# Patient Record
Sex: FEMALE | Race: WHITE
[De-identification: names, ages, dates, MRNs, and addresses within clinical notes are randomized per-mention and may not be internally consistent; named-entity substitution may affect disease eponyms.]

---

## 2017-06-17 ENCOUNTER — HOSPITAL ENCOUNTER (EMERGENCY)
Dept: HOSPITAL 59 - ER | Age: 72
Discharge: HOME | End: 2017-06-17
Payer: COMMERCIAL

## 2017-06-17 DIAGNOSIS — Z87.891: ICD-10-CM

## 2017-06-17 DIAGNOSIS — J20.9: Primary | ICD-10-CM

## 2017-06-17 DIAGNOSIS — R06.00: ICD-10-CM

## 2017-06-17 LAB
ALBUMIN SERPL-MCNC: 4.1 GM/DL (ref 3.5–5)
ALBUMIN/GLOB SERPL: 1.3 {RATIO} (ref 1.1–1.8)
ALP SERPL-CCNC: 80 U/L (ref 38–126)
ALT SERPL-CCNC: 27 U/L (ref 9–52)
ANION GAP SERPL CALC-SCNC: 5.6 MMOL/L (ref 7–16)
AST SERPL-CCNC: 24 U/L (ref 14–36)
BASOPHILS NFR BLD: 0 % (ref 0–6)
BILIRUB SERPL-MCNC: 0.65 MG/DL (ref 0.2–1.3)
BUN SERPL-MCNC: 19 MG/DL (ref 7–17)
CO2 SERPL-SCNC: 28.4 MMOL/L (ref 22–30)
CREAT SERPL-MCNC: 0.7 MG/DL (ref 0.52–1.04)
EOSINOPHIL NFR BLD: 1.7 % (ref 0–6)
ERYTHROCYTE [DISTWIDTH] IN BLOOD BY AUTOMATED COUNT: 12.9 % (ref 11.5–14.5)
EST GLOMERULAR FILTRATION RATE: > 60 ML/MIN
GLOBULIN SER-MCNC: 3.1 GM/DL (ref 1.4–4.8)
GLUCOSE SERPL-MCNC: 100 MG/DL (ref 70–110)
GRANULOCYTES NFR BLD: 59.5 % (ref 47–80)
HCT VFR BLD CALC: 37.1 % (ref 35–47)
HGB BLD-MCNC: 12.6 GM/DL (ref 11.6–16)
LYMPHOCYTES NFR BLD AUTO: 32 % (ref 16–45)
MCH RBC QN AUTO: 33.6 PG (ref 27–33)
MCHC RBC AUTO-ENTMCNC: 34 G/DL (ref 32–36)
MCV RBC AUTO: 99.2 FL (ref 81–97)
MONOCYTES NFR BLD: 6.8 % (ref 0–9)
PLATELET # BLD: 239 K/UL (ref 130–400)
PMV BLD AUTO: 8.7 FL (ref 7.4–10.4)
PROT SERPL-MCNC: 7.2 GM/DL (ref 6.3–8.2)
RBC # BLD AUTO: 3.74 M/UL (ref 3.8–5.4)
WBC # BLD AUTO: 5.2 K/UL (ref 4.2–12.2)

## 2017-06-17 PROCEDURE — 80053 COMPREHEN METABOLIC PANEL: CPT

## 2017-06-17 PROCEDURE — 99284 EMERGENCY DEPT VISIT MOD MDM: CPT

## 2017-06-17 PROCEDURE — 94640 AIRWAY INHALATION TREATMENT: CPT

## 2017-06-17 PROCEDURE — 93005 ELECTROCARDIOGRAM TRACING: CPT

## 2017-06-17 PROCEDURE — 93010 ELECTROCARDIOGRAM REPORT: CPT

## 2017-06-17 PROCEDURE — 85025 COMPLETE CBC W/AUTO DIFF WBC: CPT

## 2017-06-17 PROCEDURE — 71020: CPT

## 2017-06-17 NOTE — EMERGENCY DEPARTMENT RECORD
History of Present Illness





- General


Chief Complaint: Shortness of breath


Stated Complaint: WILMAN


Time Seen by Provider: 17 13:18


Source: Patient


Mode of Arrival: Ambulatory


Limitations: No limitations





- History of Present Illness


Initial Comments: 





71 yo female presents to ED with a CC of difficulty in breathing following a 

coughing fit this morning.  Patient reports URI symptoms that she was seen by 

her PCP for 1 week ago, reports that her symptoms improved until today.  

Patient denies previous heart problems, but does reports a history of asthma "

in " that resolved without further problems.  Patient denies chest pain or 

pressure, and denies fevers/chills.  Patient denies lower extremity edema.  

Patient reports that her symptoms are related to an URI/sinus infection.


MD Complaint: Cough


Onset/Timin


-: Days(s)


Severity: Moderate


Consistency: Intermittent


Improves With: Nothing


Worsens With: Coughing


Known History Of: Asthma


Context: Recent URI


Associated Symptoms: Denies other symptoms


Treatments Prior to Arrival: None





- Related Data


Home Oxygen Therapy: No


 Home Medications











 Medication  Instructions  Recorded  Confirmed  Last Taken


 


Cyclobenzaprine HCl 5 mg PO DAILY 14 09:00


 


Omeprazole [Omeprazole] 40 mg PO DAILY 14 09:00


 


Dronedarone HCl [Multaq] 400 mg PO DAILY 17 Unknown


 


Fluoxetine HCl [Fluoxetine HCl] 40 mg PO DAILY 17 Unknown


 


Losartan Potassium [Losartan 100 mg PO DAILY 17 Unknown





Potassium]    


 


Methotrexate Sodium [Trexall] 2.5 mg PO WEEKLY 17 Unknown


 


Naproxen [Naproxen] 500 mg PO Q12HR 17 Unknown








 Previous Rx's











 Medication  Instructions  Recorded


 


Albuterol Sulfate [Proair 90 mcg IH Q2H PRN #1 [email or web address] 17





Respiclick]  


 


Prednisone [Prednisone 20Mg] 20 mg PO TID #12 tab 17











 Allergies











Allergy/AdvReac Type Severity Reaction Status Date / Time


 


No Known Drug Allergies Allergy   Verified 14 14:53














Review of Systems


Constitutional: Denies: Chills, Fever, Malaise, Night sweats


Eyes: Denies: Eye discharge, Eye pain


ENT: Denies: Congestion, Ear pain, Epistaxis


Respiratory: Reports: Cough, Dyspnea.  Denies: Hemoptysis


Cardiovascular: Denies: Chest pain, Dyspnea on exertion


Endocrine: Denies: Fatigue, Heat or cold intolerance


Gastrointestinal: Denies: Abdominal pain, Nausea, Vomiting


Genitourinary: Denies: Dysuria, Frequency


Musculoskeletal: Denies: Arthralgia, Back pain, Gout, Joint swelling


Skin: Denies: Bruising, Change in color


Neurological: Denies: Abnormal gait, Confusion, Headache, Seizure


Psychiatric: Denies: Anxiety


Hematological/Lymphatic: Denies: Anemia, Blood Clots





Past Medical History





- SOCIAL HISTORY


Smoking Status: Former smoker





- RESPIRATORY


Hx Respiratory Disorders: No





- CARDIOVASCULAR


Hx Irregular Heartbeat: Yes





- NEURO


Hx Neuro Disorders: No





- GI


Hx GI Disorders: No





- 


Hx Genitourinary Disorders: No





- ENDOCRINE


Hx Endocrine Disorders: No


Hx Diabetes: No


Hx Thyroid Disease: No





- MUSCULOSKELETAL


Hx Arthritis: Yes


Comment:: low back, hips and knees.





- PSYCH


Hx Psych Problems: No





- HEMATOLOGY/ONCOLOGY


Hx Hematology/Oncology Disorders: No





Family Medical History


Hx Alcohol Use: Grandparents


Hx Cancer: Grandparents


Hx Heart Disease: Father





Physical Exam





- General


General Appearance: Alert, Oriented x3, Cooperative, No acute distress


Limitations: No limitations





- Head


Head exam: Atraumatic, Normocephalic, Normal inspection


Head exam detail: negative: Abrasion, Contusion, Finney's sign, General 

tenderness, Hematoma, Laceration





- Eye


Eye exam: Normal appearance.  negative: Conjunctival injection, Periorbital 

swelling, Periorbital tenderness, Scleral icterus





- ENT


Ear exam: negative: Auricular hematoma, Auricular trauma


Nasal Exam: negative: Active bleeding, Discharge, Dried blood, Foreign body


Mouth exam: negative: Drooling, Laceration, Muffled voice, Tongue elevation





- Neck


Neck exam: Normal inspection.  negative: Meningismus, Tenderness





- Respiratory


Respiratory exam: Decreased breath sounds.  negative: Rales, Respiratory 

distress, Rhonchi, Stridor





- Cardiovascular


Cardiovascular Exam: Regular rate, Normal rhythm, Normal heart sounds





- GI/Abdominal


GI/Abdominal exam: Soft.  negative: Rebound, Rigid, Tenderness





- Rectal


Rectal exam: Deferred





- 


 exam: Deferred





- Extremities


Extremities exam: Normal inspection.  negative: Calf tenderness, Pedal edema, 

Tenderness





- Back


Back exam: Denies: CVA tenderness (R), CVA tenderness (L)





- Neurological


Neurological exam: Alert, Normal gait, Oriented X3





- Psychiatric


Psychiatric exam: Normal affect, Normal mood





- Skin


Skin exam: Normal color.  negative: Abrasion


Type of lesion: negative: abrasion





Course





- Reevaluation(s)


Reevaluation #1: 





17 13:46


EKG: NSR 57


Normal axis, normal intervals


No acute ST-T wave changes


Unchanged from 16


Reevaluation #2: 





17 14:08


Duoneb completed, patient denies significant improvement in her symptoms. 

Awaiting laboratory studies and CXR.


Reevaluation #3: 





17 14:24


Laboratory studies reviewed and are grossly unremarkable for an acute process.


Reevaluation #4: 





17 14:41


CXR: Nothing acute, ? COPD, ? pulmonary nodule posteriorly





Patient was updated on all results, and NOW reports that the Duoneb did improve 

her symptoms.  Patient was counseled regarding follow-up CT  of the chest to 

exclude pulmonary nodule in 1-3 months.  Will discharge home on Albuterol as 

well as Prednisone for her symptoms.  Patient agrees with the plan as discussed.


17 14:45








Medical Decision Making





- Lab Data


Result diagrams: 


 17 13:55





 17 13:55





Disposition


Disposition: Discharge


Clinical Impression: 


 Bronchitis





Disposition: Home, Self-Care


Condition: (2) Stable


Instructions:  Acute Bronchitis (ED)


Additional Instructions: 


Return to ED if your symptoms worsen or if you have any concerns.


Prednisone and Albuterol as directed.


Follow-up with Dr. Nguyen in 3-5 days.


Follow-up CT Chest in 1-3 months to evaluate for possible pulmonary nodule.


Prescriptions: 


Albuterol Sulfate [Proair Respiclick] 90 mcg IH Q2H PRN #1 [email or web address]


 PRN Reason: Dyspnea


Prednisone [Prednisone 20Mg] 20 mg PO TID #12 tab


Forms:  Patient Portal Access


Time of Disposition: 14:49

## 2017-06-19 NOTE — RADIOLOGY REPORT
EXAM:  CHEST, TWO VIEWS



HISTORY:  DIFFICULTY BREATHING. 



TECHNIQUE:  Frontal and lateral views of the chest were obtained.  



Comparison:  None.  



FINDINGS:  The heart size is normal.  Atheromatous change of the thoracic 
aorta.  Osteopenia.  COPD.  Vague nodular density projecting over the mid 
thoracic spine on the lateral view only.  This is nonspecific.  This could be 
followed nonemergently.  The lungs are otherwise clear.  



IMPRESSION:  

UNDERLYING COPD.  VAGUE NODULAR DENSITY SEEN ON THE LATERAL VIEW ONLY 
PROJECTING OVER THE MID THORACIC SPINE.  THE ETIOLOGY IS INDETERMINATE.  THIS 
COULD RELATE TO ADJACENT END PLATE DEGENERATIVE CHANGES.  A TRUE PULMONARY 
NODULE IS NOT EXCLUDED.  THIS FINDING COULD FURTHER BE ASSESSED NONEMERGENTLY 
WITH CT CHEST IF THE PATIENT HAS NO PRIOR SUCH OUTSIDE STUDIES FOR COMPARISON.  



JOB NUMBER:  [id number]
[facility]

## 2018-09-18 ENCOUNTER — HOSPITAL ENCOUNTER (INPATIENT)
Dept: HOSPITAL 59 - ER | Age: 73
LOS: 1 days | Discharge: HOME | DRG: 872 | End: 2018-09-19
Attending: INTERNAL MEDICINE | Admitting: INTERNAL MEDICINE
Payer: COMMERCIAL

## 2018-09-18 DIAGNOSIS — R00.0: ICD-10-CM

## 2018-09-18 DIAGNOSIS — J45.909: ICD-10-CM

## 2018-09-18 DIAGNOSIS — R53.1: ICD-10-CM

## 2018-09-18 DIAGNOSIS — Z96.652: ICD-10-CM

## 2018-09-18 DIAGNOSIS — I48.91: ICD-10-CM

## 2018-09-18 DIAGNOSIS — M19.90: ICD-10-CM

## 2018-09-18 DIAGNOSIS — I10: ICD-10-CM

## 2018-09-18 DIAGNOSIS — E78.5: ICD-10-CM

## 2018-09-18 DIAGNOSIS — A41.9: Primary | ICD-10-CM

## 2018-09-18 DIAGNOSIS — N39.0: ICD-10-CM

## 2018-09-18 DIAGNOSIS — Z87.891: ICD-10-CM

## 2018-09-18 LAB
ALBUMIN SERPL-MCNC: 3.9 G/DL (ref 4–5)
ALBUMIN/GLOB SERPL: 1.1 {RATIO} (ref 1.1–1.8)
ALP SERPL-CCNC: 85 U/L (ref 35–104)
ALT SERPL-CCNC: 15 U/L (ref ?–33)
ANION GAP SERPL CALC-SCNC: 21 MMOL/L (ref 7–16)
APPEARANCE UR: (no result)
APTT BLD: 25.2 SECONDS (ref 24.5–39.1)
AST SERPL-CCNC: 28 U/L (ref 10–35)
BACTERIA #/AREA URNS HPF: (no result) /[HPF]
BILIRUB SERPL-MCNC: 0.9 MG/DL (ref 0.2–1)
BILIRUB UR-MCNC: NEGATIVE MG/DL
BUN SERPL-MCNC: 15 MG/DL (ref 8–23)
CO2 SERPL-SCNC: 24 MMOL/L (ref 22–29)
COLOR UR: YELLOW
CREAT SERPL-MCNC: 0.8 MG/DL (ref 0.5–0.9)
ERYTHROCYTE [DISTWIDTH] IN BLOOD BY AUTOMATED COUNT: 12.7 % (ref 11.5–14.5)
EST GLOMERULAR FILTRATION RATE: > 60 ML/MIN
FLUBV AG SPEC QL IA: NEGATIVE
GLOBULIN SER-MCNC: 3.4 GM/DL (ref 1.4–4.8)
GLUCOSE SERPL-MCNC: 128 MG/DL (ref 74–109)
GLUCOSE UR STRIP-MCNC: NEGATIVE MG/DL
HCT VFR BLD CALC: 39.2 % (ref 35–47)
HGB BLD-MCNC: 13 GM/DL (ref 11.6–16)
INR PPP: 0.9
KETONES UR QL STRIP: NEGATIVE
LEAD BLD-MCNC: NEGATIVE UG/DL
LYMPHOCYTES NFR BLD: 13 % (ref 16–45)
MCH RBC QN AUTO: 32.5 PG (ref 27–33)
MCHC RBC AUTO-ENTMCNC: 33.2 G/DL (ref 32–36)
MCV RBC AUTO: 98 FL (ref 81–97)
MONOCYTES NFR BLD: 2 % (ref 0–9)
NEUTROPHILS NFR BLD AUTO: 75 % (ref 47–80)
NEUTS BAND NFR BLD: 10 % (ref 0–5)
NITRITE UR QL STRIP: POSITIVE
PLATELET # BLD: 278 K/UL (ref 130–400)
PMV BLD AUTO: 8.8 FL (ref 7.4–10.4)
PROT SERPL-MCNC: 7.3 G/DL (ref 6.6–8.7)
PROTHROMBIN TIME: 9.4 SECONDS (ref 9.5–12.1)
RBC # BLD AUTO: 4 M/UL (ref 3.8–5.4)
RBC # UR STRIP: (no result) /UL
SQUAMOUS #/AREA URNS HPF: (no result) /HPF
URINE LEUKOCYTE ESTERASE: (no result)
UROBILINOGEN UR STRIP-ACNC: 0.2 E.U./DL (ref 0.2–1)
WBC # BLD AUTO: 7.3 K/UL (ref 4.2–12.2)
WBC #/AREA URNS HPF: >50 /[HPF]

## 2018-09-18 PROCEDURE — 99285 EMERGENCY DEPT VISIT HI MDM: CPT

## 2018-09-18 PROCEDURE — 81001 URINALYSIS AUTO W/SCOPE: CPT

## 2018-09-18 PROCEDURE — 71046 X-RAY EXAM CHEST 2 VIEWS: CPT

## 2018-09-18 PROCEDURE — 99239 HOSP IP/OBS DSCHRG MGMT >30: CPT

## 2018-09-18 PROCEDURE — 83605 ASSAY OF LACTIC ACID: CPT

## 2018-09-18 PROCEDURE — 96372 THER/PROPH/DIAG INJ SC/IM: CPT

## 2018-09-18 PROCEDURE — 90686 IIV4 VACC NO PRSV 0.5 ML IM: CPT

## 2018-09-18 PROCEDURE — 99223 1ST HOSP IP/OBS HIGH 75: CPT

## 2018-09-18 PROCEDURE — 96365 THER/PROPH/DIAG IV INF INIT: CPT

## 2018-09-18 PROCEDURE — 85610 PROTHROMBIN TIME: CPT

## 2018-09-18 PROCEDURE — 85730 THROMBOPLASTIN TIME PARTIAL: CPT

## 2018-09-18 PROCEDURE — 96366 THER/PROPH/DIAG IV INF ADDON: CPT

## 2018-09-18 PROCEDURE — 80053 COMPREHEN METABOLIC PANEL: CPT

## 2018-09-18 PROCEDURE — 87400 INFLUENZA A/B EACH AG IA: CPT

## 2018-09-18 PROCEDURE — 80048 BASIC METABOLIC PNL TOTAL CA: CPT

## 2018-09-18 PROCEDURE — 85027 COMPLETE CBC AUTOMATED: CPT

## 2018-09-18 RX ADMIN — CEFTRIAXONE SCH MLS/HR: 1 INJECTION, SOLUTION INTRAVENOUS at 21:16

## 2018-09-18 RX ADMIN — ACETAMINOPHEN PRN MG: 500 TABLET ORAL at 21:15

## 2018-09-18 RX ADMIN — CYCLOBENZAPRINE HYDROCHLORIDE ONE MG: 10 TABLET, FILM COATED ORAL at 22:46

## 2018-09-18 NOTE — HISTORY & PHYSICAL
History of Present Illness





- Date of Service


Date of Service for History & Physical: 09/18/18





- History of Present Illness


Admitting Diagnosis: Sepsis, UTI


History of Present Illness: 


73 year old female presented to ED for complaints of fever and chills since 

10pm last night.  Patient reports a mild cough, denies chest pain, shortness of 

breath, nausea, vomiting, or diarrhea.  Most recently she had a left knee total 

replacement on the 6th of September with no complications post-op.  She has not 

had pain, redness, warmth, or swelling of the knee.





Patient's past medical history includes hypertension, hyperlipidemia, a-fib, 

adn bilateral knee replacements





PCP: Wendy





ED Course: 


Labs: WBC 7.3, Bands 10%, Neutrophils 75%, HCO3 24, Anion gap 21, lactic acid 

5.4, kidney and liver function normal


UA: 3+ bacteria, WBCs, + nitrites


Blood cultures drawn


Influenza negative


Temp 103 - given motrin and tylenol


Chest x-ray negative


IV Zosyn and Rocephin





9/18/18: Patient alert & oriented x 4, resting comfortably in bed.  No 

complaints of pain at this time and afebrile.  Patient to continue receiving 

Rocephin q12h and 0.9%NS @ 125ml/hr.  Repeat lactic acid was 1.6, heart rate 

has normalized.  Patient to resume normal diet, will continue IV fluids and 

antibiotics overnight, recheck labs in the morning.








Travel Screening





- Travel/Exposure Within Last 30 Days


Have you traveled within the last 30 days?: No





- Travel/Exposure Within Last Year


Have you traveled outside the [place] in the last year?: No





- Additonal Travel Details


Have you been exposed to anyone with a communicable illness?: No





- Travel Symptoms


Symptom Screening: None





Review of Systems


Constitutional: Reports: Chills, Fever.  Denies: Malaise, Weakness


Eyes: Denies: Eye discharge


ENT: Denies: Congestion, Throat pain


Respiratory: Reports: Cough.  Denies: Dyspnea, Hemoptysis, Stridor, Wheezes


Cardiovascular: Denies: Chest pain, Edema, Palpitations, Syncope


Endocrine: Denies: Fatigue


Gastrointestinal: Denies: Abdominal pain, Diarrhea, Nausea, Vomiting


Genitourinary: Reports: Frequency.  Denies: Discharge, Dysuria, Hematuria, 

Retention, Urgency


Musculoskeletal: Denies: Arthralgia, Back pain, Joint swelling, Myalgia


Skin: Denies: Bruising, Change in color, Rash


Neurological: Denies: Headache


Psychiatric: Denies: Anxiety


Hematological/Lymphatic: Denies: Anemia, Easy bleeding, Easy bruising, Swollen 

glands





Past Medical History





- SOCIAL HISTORY


Smoking Status: Former smoker


Alcohol Use: Occasional


Alcohol Use Comment: 7/7


Drug Use: None





- RESPIRATORY


Hx Respiratory Disorders: No





- CARDIOVASCULAR


Hx Cardio Disorders: Yes


Hx Hypertension: Yes


Hx Irregular Heartbeat: Yes





- NEURO


Hx Neuro Disorders: No





- GI


Hx GI Disorders: No





- 


Hx Genitourinary Disorders: No


Hx UTI: Yes (current admit dx)


Comment:: worn peripad last several years due to leaking





- ENDOCRINE


Hx Endocrine Disorders: No


Hx Diabetes: No


Hx Thyroid Disease: No





- MUSCULOSKELETAL


Hx Musculoskeletal Disorders: Yes


Hx Arthritis: Yes


Comment:: low back, hips and knees. bilateral knee replacements this year





- PSYCH


Hx Psych Problems: No





- HEMATOLOGY/ONCOLOGY


Hx Hematology/Oncology Disorders: No





Family Medical History


Any Significant Family History?: Yes


Hx Alcohol Use: Grandparents


Hx Cancer: Grandparents


Hx Heart Disease: Father





H&P Meds/Allergies





- Allergies


Allergies: 


 Allergies











Allergy/AdvReac Type Severity Reaction Status Date / Time


 


No Known Drug Allergies Allergy   Verified 05/23/14 14:53














- Home Medications


 Home Medications











 Medication  Instructions  Recorded  Confirmed  Last Taken


 


Hydrochlorothiazide [Hctz] 25 mg PO DAILY 09/18/18 09/18/18 Unknown


 


Meloxicam 15 mg PO DAILY 09/18/18 09/18/18 Unknown


 


Propafenone HCl [Propafenone HCl 150 mg PO DAILY 09/18/18 09/18/18 Unknown





ER]    














- Active Medications


Active Medications: 


 Current Medications





Acetaminophen (Tylenol 500mg Tab)  1,000 mg PO Q6H PRN


   PRN Reason: PAIN - MILD(1-4)/FEVER


Enoxaparin Sodium (Lovenox)  40 mg SQ DAILY CK


Fluoxetine HCl (Prozac)  40 mg PO DAILY CK


Sodium Chloride ()  1,000 mls @ 125 mls/hr IV .Q8H PRN


   PRN Reason: LARGE VOLUME IV


   Last Admin: 09/18/18 17:16 Dose:  125 mls/hr


CEFTRIAXONE 1GM/50ML BAG (Ceftriaxone 1 Gm-D5w Bag)  1 gm in 50 mls @ 100 mls/

hr IVPB Q12HR CK


Ibuprofen (Motrin 400mg)  400 mg PO Q8H PRN


   PRN Reason: PAIN - MILD (1-4)


Non-Formulary Medication (Propafenone Hcl [Propafenone Hcl Er])  150 mg PO TID 

[facility]


   Last Admin: 09/18/18 18:34 Dose:  150 mg


Ondansetron HCl (Zofran)  4 mg IVP Q4H PRN


   PRN Reason: NAUSEA


P[facility]op[facility]ix)  [facility]











Physical Exam





- Vital Signs


Vital Signs: 


 Vital Signs - Last 24 Hrs











  Temp Pulse Pulse Resp BP BP Pulse Ox


 


 09/18/18 18:41    88  18   


 


 09/18/18 17:45  97.9 F   80  16   99/57  95


 


 09/18/18 15:55  98.7 F    18  109/83   97


 


 09/18/18 15:45  98.9 F   88  18   109/69  92 L


 


 09/18/18 14:39  98.9 F   82  22   129/67  97


 


 09/18/18 13:01    99 H  18   111/64  96


 


 09/18/18 11:47  100.7 F H   96 H  22   110/72  96


 


 09/18/18 10:54  103.2 F H   103 H  20   132/71  92 L


 


 09/18/18 09:46  101.3 F H  125 H   24  135/97   95














- General


General Appearance: Alert, Oriented x3, Cooperative, No acute distress


Limitations: No limitations





- Head


Head exam: Atraumatic, Normal inspection





- Eye


Eye exam: Normal appearance, PERRL.  negative: Conjunctival injection, Scleral 

icterus





- ENT


ENT exam: Normal exam, Mucous membranes moist


Ear exam: Normal external inspection


Nasal Exam: Normal inspection


Mouth exam: Normal external inspection





- Neck


Neck exam: Normal inspection, Full ROM.  negative: Lymphadenopathy, Tenderness





- Respiratory


Respiratory exam: Normal lung sounds bilaterally.  negative: Accessory muscle 

use, Respiratory distress, Rhonchi, Stridor, Wheezes





- Cardiovascular


Cardiovascular Exam: Normal rhythm, Normal heart sounds


Peripheral Pulses: 2+: Radial (R), Radial (L)





- GI/Abdominal


GI/Abdominal exam: Soft.  negative: Tenderness





- Rectal


Rectal exam: Deferred





- 


 exam: Deferred





- Extremities


Extremities exam: Normal inspection, Full ROM, Normal capillary refill, Other (

The knee is normal on inspection, no warmth or redness, non tender.  Incision 

is clean and intact).  negative: Tenderness





- Back


Back exam: Denies: CVA tenderness (R), CVA tenderness (L), Rash noted





- Neurological


Neurological exam: Alert, Oriented X3





- Psychiatric


Psychiatric exam: Normal affect, Normal mood





- Skin


Skin exam: Dry, Intact, Normal color, Warm





Results





- Labs


Result Diagrams: 


 09/18/18 10:00





 09/18/18 10:00


Labs Last 24 Hours: 


 Laboratory Results - last 24 hr











  09/18/18 09/18/18 09/18/18





  10:00 10:00 10:00


 


WBC  7.3  


 


RBC  4.00  


 


Hgb  13.0  


 


Hct  39.2  


 


MCV  98.0 H  


 


MCH  32.5  


 


MCHC  33.2  


 


RDW  12.7  


 


Plt Count  278  


 


MPV  8.8  


 


Neutrophils %  75.0  


 


Band Neutrophils %  10.0 H  


 


Eosinophils %  Not Reportable  


 


Basophils %  Not Reportable  


 


Lymphocytes  13.0 L  


 


Monocytes  2.0  


 


PT   9.4 L 


 


INR   0.9 


 


APTT   25.2 


 


Sodium    132 L


 


Potassium    4.1


 


Chloride    87 L


 


Carbon Dioxide    24.0


 


Anion Gap    21.0 H


 


BUN    15


 


Creatinine    0.8


 


Estimated GFR    > 60


 


Random Glucose    128 H


 


Lactic Acid    Cancelled


 


Calcium    9.0


 


Total Bilirubin    0.90


 


AST    28


 


ALT    15


 


Alkaline Phosphatase    85


 


Total Protein    7.3


 


Albumin    3.9 L


 


Globulin    3.4


 


Albumin/Globulin Ratio    1.1


 


Urine Color   


 


Urine Appearance   


 


Urine pH   


 


Ur Specific Gravity   


 


Urine Protein   


 


Urine Glucose (UA)   


 


Urine Ketones   


 


Urine Blood   


 


Urine Nitrite   


 


Urine Bilirubin   


 


Urine Urobilinogen   


 


Ur Leukocyte Esterase   


 


Urine RBC   


 


Urine WBC   


 


U Non-Squamous Epi Cells   


 


Urine Bacteria   


 


Influenza Type A Ag   


 


Influenza Type B Ag   














  09/18/18 09/18/18 09/18/18





  10:00 10:00 11:30


 


WBC   


 


RBC   


 


Hgb   


 


Hct   


 


MCV   


 


MCH   


 


MCHC   


 


RDW   


 


Plt Count   


 


MPV   


 


Neutrophils %   


 


Band Neutrophils %   


 


Eosinophils %   


 


Basophils %   


 


Lymphocytes   


 


Monocytes   


 


PT   


 


INR   


 


APTT   


 


Sodium   


 


Potassium   


 


Chloride   


 


Carbon Dioxide   


 


Anion Gap   


 


BUN   


 


Creatinine   


 


Estimated GFR   


 


Random Glucose   


 


Lactic Acid   5.4 H 


 


Calcium   


 


Total Bilirubin   


 


AST   


 


ALT   


 


Alkaline Phosphatase   


 


Total Protein   


 


Albumin   


 


Globulin   


 


Albumin/Globulin Ratio   


 


Urine Color    Yellow


 


Urine Appearance    Cloudy


 


Urine pH    6.0


 


Ur Specific Gravity    1.015


 


Urine Protein    100 mg/dl H


 


Urine Glucose (UA)    Negative


 


Urine Ketones    Negative


 


Urine Blood    Large H


 


Urine Nitrite    Positive H


 


Urine Bilirubin    Negative


 


Urine Urobilinogen    0.2


 


Ur Leukocyte Esterase    Moderate H


 


Urine RBC    10 - 15


 


Urine WBC    >50


 


U Non-Squamous Epi Cells    0 - 2


 


Urine Bacteria    3+


 


Influenza Type A Ag  Negative  


 


Influenza Type B Ag  Negative  














  09/18/18





  16:27


 


WBC 


 


RBC 


 


Hgb 


 


Hct 


 


MCV 


 


MCH 


 


MCHC 


 


RDW 


 


Plt Count 


 


MPV 


 


Neutrophils % 


 


Band Neutrophils % 


 


Eosinophils % 


 


Basophils % 


 


Lymphocytes 


 


Monocytes 


 


PT 


 


INR 


 


APTT 


 


Sodium 


 


Potassium 


 


Chloride 


 


Carbon Dioxide 


 


Anion Gap 


 


BUN 


 


Creatinine 


 


Estimated GFR 


 


Random Glucose 


 


Lactic Acid  1.6


 


Calcium 


 


Total Bilirubin 


 


AST 


 


ALT 


 


Alkaline Phosphatase 


 


Total Protein 


 


Albumin 


 


Globulin 


 


Albumin/Globulin Ratio 


 


Urine Color 


 


Urine Appearance 


 


Urine pH 


 


Ur Specific Gravity 


 


Urine Protein 


 


Urine Glucose (UA) 


 


Urine Ketones 


 


Urine Blood 


 


Urine Nitrite 


 


Urine Bilirubin 


 


Urine Urobilinogen 


 


Ur Leukocyte Esterase 


 


Urine RBC 


 


Urine WBC 


 


U Non-Squamous Epi Cells 


 


Urine Bacteria 


 


Influenza Type A Ag 


 


Influenza Type B Ag 














VTE H&P Assessment





- Risk for VTE


Risk for VTE: Yes


Risk Level: Moderate


Risk Assessment Date: 09/18/18


Risk Assessment Time: 17:00


VTE Orders Placed or Will Be Placed: Yes





Plan





- Inpatient Certification


Inpatient Certification: 


Admit to inpatient care: Based on my medical assessment, after consideration of 

patient's risk factors (age, co-morbidities and patient presenting symptoms and 

acuity), I expect that this patient will remain in the hospital greater than or 

equal to two midnights and that the services needed warrant inpatient care 

because:





Patient Risk Factors: [age, febrile, recent surgery, urosepsis]





Estimated length of stay: The patient may reasonably be expected to be 

discharged or transferred to a hospital within 24-72 hours after admission to 

[facility].





Services needed: [IV fluids, IV antibiotics, cardiac monitoring]





Post hospital care (if known): []





I certify that my determination is in accordance with my understanding of 

Medicare requirements for reasonable and necessary inpatient services.





09/18/18 21:33








- Detailed Diagnosis and Plan


(1) Urinary tract infection


Current Visit: Yes   Status: Acute   Base Code: N39.0 - URINARY TRACT INFECTION

, SITE NOT SPECIFIED   Comment: 9/18/18: UA positive for nitrites, moderate 

leukocytes, 3+ bacteria


-Rocephin IVPB q12h


-0.9%NS @ 125ml/hr   





(2) Sepsis


Current Visit: Yes   Status: Acute   Base Code: A41.9 - SEPSIS, UNSPECIFIED 

ORGANISM   Comment: 9/18/18: positive for UTI, febrile, tachycardic, lactic 

acid 5.6, bicarb normal, WBC normal


-Rocephin IVPG q12h


-0.9% NS @ 125ml/hr


-Repeat lactic acid 1.6


-Cardiac monitor


-Repeat CBC and CMP in the morning


-Tylenol and Motrin as needed for fever   





(3) DVT prophylaxis


Current Visit: Yes   Status: Acute   Base Code: [id number] -    Comment: 9/18/18: 

moderate risk due to age, hospitalization, and decreased mobility


-Lovenox 40mg SQ daily   





(4) Full code status


Current Visit: Yes   Status: Acute   Base Code: Z78.9 - OTHER SPECIFIED HEALTH 

STATUS   Comment: 9/18/18: Full code status this admission

## 2018-09-18 NOTE — EMERGENCY DEPARTMENT RECORD
History of Present Illness





- General


Chief Complaint: Fever


Stated Complaint: FEVER


Time Seen by Provider: 09/18/18 09:49


Source: Patient, Family, Old records reviewed


Mode of Arrival: Ambulatory


Limitations: No limitations





- History of Present Illness


Initial Comments: 





72 yo female presents with fever and chills.  She states this started at 10pm.  

She has had a mild cough.  She states she is having waves of shaking chills.  

No nausea, vomiting or diarrhea.  She denies chest pain or shortness of breath.

  No abdominal pain.  She has had some mild urinary frequency.   She had a left 

knee replacement on the 6th of September.  She has had a good recover.  No pain

, redness, warmth or swelling.  No new changes to the knee area.  No other 

rashes or new concerns.  


MD Complaint: Fever, Weakness


-: Hour(s) (12)


Context: Recent procedure


Associated Symptoms: Cough (mild)


Treatments Prior to Arrival: None (No treatment prior to arrival)





- Related Data


 Home Medications











 Medication  Instructions  Recorded  Confirmed  Last Taken


 


Hydrochlorothiazide [Hctz] 25 mg PO DAILY 09/18/18 09/18/18 Unknown


 


Meloxicam 15 mg PO DAILY 09/18/18 09/18/18 Unknown


 


Propafenone HCl [Propafenone HCl 150 mg PO DAILY 09/18/18 09/18/18 Unknown





ER]    











 Allergies











Allergy/AdvReac Type Severity Reaction Status Date / Time


 


No Known Drug Allergies Allergy   Verified 05/23/14 14:53














Review of Systems


Constitutional: Reports: Chills, Fever.  Denies: Malaise, Weakness


Eyes: Denies: Eye discharge


ENT: Denies: Congestion, Throat pain


Respiratory: Reports: Cough.  Denies: Dyspnea, Hemoptysis, Stridor, Wheezes


Cardiovascular: Denies: Chest pain, Edema, Palpitations, Syncope


Endocrine: Denies: Fatigue


Gastrointestinal: Denies: Abdominal pain, Diarrhea, Nausea, Vomiting


Genitourinary: Reports: Frequency.  Denies: Discharge, Dysuria, Hematuria, 

Retention, Urgency


Musculoskeletal: Denies: Arthralgia, Back pain, Joint swelling, Myalgia


Skin: Denies: Bruising, Change in color, Rash


Neurological: Denies: Headache


Psychiatric: Denies: Anxiety


Hematological/Lymphatic: Denies: Anemia, Easy bleeding, Easy bruising, Swollen 

glands





Past Medical History





- SOCIAL HISTORY


Smoking Status: Former smoker





- RESPIRATORY


Hx Respiratory Disorders: No





- CARDIOVASCULAR


Hx Irregular Heartbeat: Yes





- NEURO


Hx Neuro Disorders: No





- GI


Hx GI Disorders: No





- 


Hx Genitourinary Disorders: No





- ENDOCRINE


Hx Endocrine Disorders: No


Hx Diabetes: No


Hx Thyroid Disease: No





- MUSCULOSKELETAL


Hx Arthritis: Yes


Comment:: low back, hips and knees.





- PSYCH


Hx Psych Problems: No





- HEMATOLOGY/ONCOLOGY


Hx Hematology/Oncology Disorders: No





Family Medical History


Hx Alcohol Use: Grandparents


Hx Cancer: Grandparents


Hx Heart Disease: Father





Physical Exam





- General


General Appearance: Alert, Oriented x3, Cooperative, No acute distress


Limitations: No limitations





- Head


Head exam: Atraumatic, Normal inspection





- Eye


Eye exam: Normal appearance, PERRL.  negative: Conjunctival injection, Scleral 

icterus





- ENT


ENT exam: Normal exam, Mucous membranes moist


Ear exam: Normal external inspection


Nasal Exam: Normal inspection


Mouth exam: Normal external inspection





- Neck


Neck exam: Normal inspection, Full ROM.  negative: Lymphadenopathy, Tenderness





- Respiratory


Respiratory exam: Normal lung sounds bilaterally.  negative: Accessory muscle 

use, Respiratory distress, Rhonchi, Stridor, Wheezes





- Cardiovascular


Cardiovascular Exam: Normal rhythm, Normal heart sounds, Tachycardia





- GI/Abdominal


GI/Abdominal exam: Soft.  negative: Tenderness





- Rectal


Rectal exam: Deferred





- 


 exam: Deferred





- Extremities


Extremities exam: Normal inspection, Full ROM, Normal capillary refill.  

negative: Tenderness





- Back


Back exam: Reports: Other (The knee is normal on inspection, no warmth or 

redness, non tender.  Incision is clean and intact).  Denies: CVA tenderness (R)

, CVA tenderness (L), Rash noted





- Neurological


Neurological exam: Alert, Oriented X3





- Psychiatric


Psychiatric exam: Normal affect, Normal mood





- Skin


Skin exam: Dry, Intact, Normal color, Warm





Course





- Reevaluation(s)


Reevaluation #1: 


The CBC was reviewed


WBC is 7.3.


Bands 10%


Neutrophils 75%





HCO3 24


AG 21


Lactic Acid 5.4


09/18/18 10:42





09/18/18 10:47


The patient was urged to give a urine or cath UA.  She is refusing cath UA.  


The second blood culture was obtained. 


Given fever, tachycardia, elevated Lactic Acid, 10% Bands, recommend 

antibiotics.


09/18/18 10:49





09/18/18 10:54


Repeat Temp 103.


HR improved to 103


/71


Motrin 600mg ordered


The patient subjectively is feeling better.





09/18/18 11:06


Normal renal and hepatic function


CXR was read as no acute process.  No infiltrate.


Patient provided UA


09/18/18 11:50


The prelim UA is consistent with a urinary source for infection


Current HR improved to 97.


The BP is 110/72


Clinically the patient is continuing to feel greatly improved


I recommend admission, IV antibiotics


09/18/18 11:51


+3 bacteria and WBC's.


09/18/18 11:54





09/18/18 12:00


The patient has continued to improve


She is stable for admission to [facility]


I KELLIE Lira of the admission service for admission.





Medical Decision Making





- Lab Data


Result diagrams: 


 09/18/18 10:00





 09/18/18 10:00





Disposition


Disposition: Admit


Clinical Impression: 


 Sepsis, Urinary tract infection





Disposition: Still a Patient at [facility]


Decision to Admit: Admit from ER


Decision to Admit Date: 09/18/18


Decision to Admit Time: 11:55


Condition: (2) Stable


Forms:  Patient Portal Access


Time of Disposition: 11:55





Quality





- Quality Measures


Quality Measures: N/A





- Blood Pressure Screening


Does Patient Have Any of the Following: Active Dx of HTN


Blood Pressure Classification: Hypertensive Reading


Systolic Measurement: 135


Diastolic Measurement: 97


Screening for High Blood Pressure: Patient Exclusion, Hx of HTN []


Pre-Hypertensive Follow-up Interventions: Referral to alternative/primary care 

provider.

## 2018-09-19 LAB
ANION GAP SERPL CALC-SCNC: 15 MMOL/L (ref 7–16)
BASOPHILS NFR BLD: 0 % (ref 0–6)
BASOPHILS NFR BLD: 0 % (ref 0–6)
BASOPHILS NFR BLD: 0.1 % (ref 0–6)
BUN SERPL-MCNC: 14 MG/DL (ref 8–23)
CO2 SERPL-SCNC: 24 MMOL/L (ref 22–29)
CREAT SERPL-MCNC: 0.9 MG/DL (ref 0.5–0.9)
EOSINOPHIL NFR BLD: 0 % (ref 0–6)
EOSINOPHIL NFR BLD: 0.1 % (ref 0–6)
EOSINOPHIL NFR BLD: 0.3 % (ref 0–6)
ERYTHROCYTE [DISTWIDTH] IN BLOOD BY AUTOMATED COUNT: 12.5 % (ref 11.5–14.5)
ERYTHROCYTE [DISTWIDTH] IN BLOOD BY AUTOMATED COUNT: 12.7 % (ref 11.5–14.5)
EST GLOMERULAR FILTRATION RATE: > 60 ML/MIN
GLUCOSE SERPL-MCNC: 110 MG/DL (ref 74–109)
HCT VFR BLD CALC: 32.2 % (ref 35–47)
HCT VFR BLD CALC: 32.3 % (ref 35–47)
HGB BLD-MCNC: 10.8 GM/DL (ref 11.6–16)
HGB BLD-MCNC: 11.2 GM/DL (ref 11.6–16)
LYMPHOCYTES NFR BLD AUTO: 5.8 % (ref 16–45)
LYMPHOCYTES NFR BLD AUTO: 5.9 % (ref 16–45)
LYMPHOCYTES NFR BLD: 1 % (ref 16–45)
LYMPHOCYTES NFR BLD: 6 % (ref 16–45)
MCH RBC QN AUTO: 32.7 PG (ref 27–33)
MCH RBC QN AUTO: 33.3 PG (ref 27–33)
MCHC RBC AUTO-ENTMCNC: 33.4 G/DL (ref 32–36)
MCHC RBC AUTO-ENTMCNC: 34.8 G/DL (ref 32–36)
MCV RBC AUTO: 95.8 FL (ref 81–97)
MCV RBC AUTO: 97.9 FL (ref 81–97)
MONOCYTES NFR BLD: 2 % (ref 0–9)
MONOCYTES NFR BLD: 4.9 % (ref 0–9)
MONOCYTES NFR BLD: 5 % (ref 0–9)
MONOCYTES NFR BLD: 6.3 % (ref 0–9)
NEUTROPHILS NFR BLD AUTO: 81 % (ref 47–80)
NEUTROPHILS NFR BLD AUTO: 97 % (ref 47–80)
NEUTS BAND NFR BLD: 0 % (ref 0–5)
NEUTS BAND NFR BLD: 8 % (ref 0–5)
PLATELET # BLD EST: NORMAL 10*3/UL
PLATELET # BLD: 122 K/UL (ref 130–400)
PLATELET # BLD: 154 K/UL (ref 130–400)
PMV BLD AUTO: 9 FL (ref 7.4–10.4)
PMV BLD AUTO: 9.1 FL (ref 7.4–10.4)
RBC # BLD AUTO: 3.3 M/UL (ref 3.8–5.4)
RBC # BLD AUTO: 3.36 M/UL (ref 3.8–5.4)
RBC MORPH BLD: NORMAL
WBC # BLD AUTO: 13.9 K/UL (ref 4.2–12.2)
WBC # BLD AUTO: 14.4 K/UL (ref 4.2–12.2)

## 2018-09-19 RX ADMIN — CYCLOBENZAPRINE HYDROCHLORIDE ONE MG: 10 TABLET, FILM COATED ORAL at 02:03

## 2018-09-19 RX ADMIN — ACETAMINOPHEN PRN MG: 500 TABLET ORAL at 07:12

## 2018-09-19 RX ADMIN — ACETAMINOPHEN PRN MG: 500 TABLET ORAL at 15:29

## 2018-09-19 RX ADMIN — CEFTRIAXONE SCH MLS/HR: 1 INJECTION, SOLUTION INTRAVENOUS at 10:51

## 2018-09-19 NOTE — DISCHARGE SUMMARY
Providers


Discharge Summary Date: 09/19/18


Date of admission: 


09/18/18 15:29





Expected Date of Discharge: 09/19/18


Attending physician: 


ALETA AGUDELO





Primary care physician: 


PATTI BARRON M.D.








Physical Exam





- Vital Signs


Vital Signs: 


 Vital Signs - Last 24 Hrs











  Temp Pulse Resp BP Pulse Ox


 


 09/19/18 07:35  99.1 F  81  16  147/73  95


 


 09/19/18 05:00  99.5 F  80  16  154/68  95


 


 09/18/18 21:00  98.9 F  75  16  118/73  95


 


 09/18/18 18:41   88  18  


 


 09/18/18 17:45  97.9 F  80  16  99/57  95














- General


General Appearance: Alert, Oriented x3, Cooperative, No acute distress


Limitations: No limitations





- Head


Head exam: Atraumatic, Normal inspection





- Eye


Eye exam: Normal appearance, PERRL.  negative: Conjunctival injection, Scleral 

icterus





- ENT


ENT exam: Normal exam, Mucous membranes moist


Ear exam: Normal external inspection


Nasal Exam: Normal inspection


Mouth exam: Normal external inspection





- Neck


Neck exam: Normal inspection, Full ROM.  negative: Lymphadenopathy, Tenderness





- Respiratory


Respiratory exam: Normal lung sounds bilaterally.  negative: Accessory muscle 

use, Respiratory distress, Rhonchi, Stridor, Wheezes





- Cardiovascular


Cardiovascular Exam: Normal rhythm, Normal heart sounds


Peripheral Pulses: 2+: Radial (R), Radial (L)





- GI/Abdominal


GI/Abdominal exam: Soft.  negative: Tenderness





- Rectal


Rectal exam: Deferred





- 


 exam: Deferred





- Extremities


Extremities exam: Normal inspection, Full ROM, Normal capillary refill, Other (

The knee is normal on inspection, no warmth or redness, non tender.  Incision 

is clean and intact).  negative: Tenderness





- Back


Back exam: Denies: CVA tenderness (R), CVA tenderness (L), Rash noted





- Neurological


Neurological exam: Alert, Oriented X3





- Psychiatric


Psychiatric exam: Normal affect, Normal mood





- Skin


Skin exam: Dry, Intact, Normal color, Warm





Hospitalization





- Hospitalization


Admission Diagnosis: Sepsis, UTI





- Problem List/Discharge Diagnosis


(1) Urinary tract infection


Current Visit: Yes   Status: Acute   Base Code: N39.0 - URINARY TRACT INFECTION

, SITE NOT SPECIFIED   Comment: 9/19/18: UA positive for nitrites, moderate 

leukocytes, 3+ bacteria


-Rocephin IVPB q12h


-0.9%NS @ 125ml/hr   





(2) Sepsis


Current Visit: Yes   Status: Acute   Base Code: A41.9 - SEPSIS, UNSPECIFIED 

ORGANISM   Comment: 9/19/18: positive for UTI, febrile, tachycardic, lactic 

acid 5.6, bicarb normal, WBC normal


-Rocephin IVPG q12h


-0.9% NS @ 125ml/hr


-Repeat lactic acid 1.6


-Cardiac monitor


-Repeat CBC, WBC now trending down


-Tylenol and Motrin as needed for fever; has remained afebrile for 24 hours   





(3) DVT prophylaxis


Current Visit: Yes   Status: Acute   Base Code: [id number] -    Comment: 9/19/18: 

moderate risk due to age, hospitalization, and decreased mobility


-Will not require continued treatment at home as patient will resume normal 

activity   





(4) Full code status


Current Visit: Yes   Status: Acute   Base Code: Z78.9 - OTHER SPECIFIED HEALTH 

STATUS   Comment: 9/19/18: Full code status this admission   





- Hospitalization Course


Disposition: Home, Self-Care


Hospital Course: 


73 year old female presented to ED for complaints of fever and chills since 

10pm last night.  Patient reports a mild cough, denies chest pain, shortness of 

breath, nausea, vomiting, or diarrhea.  Most recently she had a left knee total 

replacement on the 6th of September with no complications post-op.  She has not 

had pain, redness, warmth, or swelling of the knee.





Patient's past medical history includes hypertension, hyperlipidemia, a-fib, 

adn bilateral knee replacements





PCP: Wendy





ED Course: 


Labs: WBC 7.3, Bands 10%, Neutrophils 75%, HCO3 24, Anion gap 21, lactic acid 

5.4, kidney and liver function normal


UA: 3+ bacteria, WBCs, + nitrites


Blood cultures drawn


Influenza negative


Temp 103 - given motrin and tylenol


Chest x-ray negative


IV Zosyn and Rocephin





9/18/18: Patient alert & oriented x 4, resting comfortably in bed.  No 

complaints of pain at this time and afebrile.  Patient to continue receiving 

Rocephin q12h and 0.9%NS @ 125ml/hr.  Repeat lactic acid was 1.6, heart rate 

has normalized.  Patient to resume normal diet, will continue IV fluids and 

antibiotics overnight, recheck labs in the morning.





9/19/18: Patient A&O x 4, sitting up on edge of bed.  Patient states she wishes 

to go home.  Has remained afebrile for 24 hours, WBC now improving.  Will 

continue on cefdinir at home.  Continue increasing fluids, rest, and tylenol 

and motrin as needed for fever.  Patient to follow-up with PCP in 1-2 weeks.


Procedures: 


Imaging and X-Rays





09/18/18 09:50


CHEST 2 VIEWS [RAD] Stat 








Cardiology Procedures





09/18/18 09:50


Cardiac Monitor NOW 





09/18/18 15:45


Cardiac Monitor .Continuous 











Abnormal Labs: 


 Abnormal Lab Results











  09/18/18 09/18/18 09/18/18 Range/Units





  10:00 10:00 10:00 


 


WBC     (4.2-12.2)  K/uL


 


RBC     (3.80-5.40)  M/uL


 


Hgb     (11.6-16.0)  gm/dl


 


Hct     (35.0-47.0)  %


 


MCV  98.0 H    (81-97)  fl


 


MCH     (27-33)  pg


 


Plt Count     (130-400)  K/uL


 


Neutrophils %     (47-80)  %


 


Band Neutrophils %  10.0 H    (0-5)  %


 


Lymphocytes %     (16-45)  %


 


Lymphocytes  13.0 L    (16-45)  %


 


PT   9.4 L   (9.5-12.1)  SECONDS


 


Sodium    132 L  (136-145)  mmol/L


 


Chloride    87 L  ()  mmol/L


 


Anion Gap    21.0 H  (7-16)  


 


Random Glucose    128 H  ()  mg/dL


 


Lactic Acid     (0.5-2.2)  mmol/L


 


Calcium     (8.8-10.2)  mg/dL


 


Albumin    3.9 L  (4.0-5.0)  g/dL


 


Urine Protein     (NEGATIVE)  


 


Urine Blood     (NEGATIVE)  


 


Urine Nitrite     (NEGATIVE)  


 


Ur Leukocyte Esterase     (NEGATIVE)  














  09/18/18 09/18/18 09/19/18 Range/Units





  10:00 11:30 06:31 


 


WBC    14.4 H  (4.2-12.2)  K/uL


 


RBC    3.30 L  (3.80-5.40)  M/uL


 


Hgb    10.8 L  (11.6-16.0)  gm/dl


 


Hct    32.3 L  (35.0-47.0)  %


 


MCV    97.9 H  (81-97)  fl


 


MCH     (27-33)  pg


 


Plt Count     (130-400)  K/uL


 


Neutrophils %    81.0 H  (47-80)  %


 


Band Neutrophils %    8.0 H  (0-5)  %


 


Lymphocytes %    5.9 L  (16-45)  %


 


Lymphocytes    6.0 L  (16-45)  %


 


PT     (9.5-12.1)  SECONDS


 


Sodium     (136-145)  mmol/L


 


Chloride     ()  mmol/L


 


Anion Gap     (7-16)  


 


Random Glucose     ()  mg/dL


 


Lactic Acid  5.4 H    (0.5-2.2)  mmol/L


 


Calcium     (8.8-10.2)  mg/dL


 


Albumin     (4.0-5.0)  g/dL


 


Urine Protein   100 mg/dl H   (NEGATIVE)  


 


Urine Blood   Large H   (NEGATIVE)  


 


Urine Nitrite   Positive H   (NEGATIVE)  


 


Ur Leukocyte Esterase   Moderate H   (NEGATIVE)  














  09/19/18 09/19/18 Range/Units





  06:31 14:34 


 


WBC   13.9 H  (4.2-12.2)  K/uL


 


RBC   3.36 L  (3.80-5.40)  M/uL


 


Hgb   11.2 L  (11.6-16.0)  gm/dl


 


Hct   32.2 L  (35.0-47.0)  %


 


MCV    (81-97)  fl


 


MCH   33.3 H  (27-33)  pg


 


Plt Count   122 L  (130-400)  K/uL


 


Neutrophils %   97.0 H  (47-80)  %


 


Band Neutrophils %    (0-5)  %


 


Lymphocytes %   5.8 L  (16-45)  %


 


Lymphocytes   1.0 L  (16-45)  %


 


PT    (9.5-12.1)  SECONDS


 


Sodium  131 L   (136-145)  mmol/L


 


Chloride  92 L   ()  mmol/L


 


Anion Gap    (7-16)  


 


Random Glucose  110 H   ()  mg/dL


 


Lactic Acid    (0.5-2.2)  mmol/L


 


Calcium  8.1 L   (8.8-10.2)  mg/dL


 


Albumin    (4.0-5.0)  g/dL


 


Urine Protein    (NEGATIVE)  


 


Urine Blood    (NEGATIVE)  


 


Urine Nitrite    (NEGATIVE)  


 


Ur Leukocyte Esterase    (NEGATIVE)  











Condition at Discharge: (2) Stable





VTE Discharge


VTE Reason For No Overlap Therapy: Not Indicated





Discharge Medications





- Discharge Medications


Prescriptions: 


Bifidobacterium Infantis [Align] 4 mg PO DAILY #14 capsule


Cefdinir [Omnicef] 300 mg PO BID 6 Days #12 cap


Loperamide HCl [Immodium] 2 mg PO Q4H PRN #14 capsule


 PRN Reason: Diarrhea


Home Medications: 


 Ambulatory Orders





Omeprazole 40 mg PO DAILY 05/23/14 [Last Taken 05/23/14 09:00]


Fluoxetine HCl 40 mg PO DAILY 06/17/17 [Last Taken Unknown]


Losartan Potassium 100 mg PO DAILY 06/17/17 [Last Taken Unknown]


Hydrochlorothiazide [Hctz] 25 mg PO DAILY 09/18/18 [Last Taken Unknown]


Meloxicam 15 mg PO DAILY 09/18/18 [Last Taken Unknown]


Propafenone HCl [Propafenone HCl ER] 150 mg PO DAILY 09/18/18 [Last Taken 

Unknown]


Bifidobacterium Infantis [Align] 4 mg PO DAILY #14 capsule 09/19/18 [Last Taken 

Unknown]


Cefdinir [Omnicef] 300 mg PO BID 6 Days #12 cap 09/19/18 [Last Taken Unknown]


Loperamide HCl [Immodium] 2 mg PO Q4H PRN #14 capsule 09/19/18 [Last Taken 

Unknown]











Discharge Plan





- Discharge Instructions


Activity at Discharge: Increase Activity as Tolerated


Diet at Discharge: Regular Diet


Additional Instructions: 


-Starting tomorrow take the antibiotic twice a day


-Use the Immodium as needed for diarrhea


-Take the probiotic daily for the diarrhea


-Increase your fluids, get plenty of rest


-Take Tylenol 500mg every 6 hours as needed for fever


-Follow-up with your PCP in 1-2 weeks





Quality Measures





- Quality Measures


Quality Measures: Advance Directives, Documentation of Current Medications in 

Medical Record, Elder Maltreatment Screen and Follow-Up Plan, Screening for 

High Blood Pressure and F/U Documented





- Current Medications


Quality Measure: Measure #130: Documentation of Current Medications


Documentation of Current Medications: <Current Medications Documented/Reviewed> 

[]





- Blood Pressure Screening


Quality Measure: Screening for High Blood Pressure and Follow-Up Documented


Does Patient Have Any of the Following: Active Dx of HTN


Blood Pressure Classification: Pre-Hypertensive BP Reading


Systolic Measurement: 109


Diastolic Measurement: 83


Screening for High Blood Pressure: Patient Exclusion, Hx of HTN []





- Advance Directives


Quality Measure: Measure #47: Care Plan


Advance Directives Established: No


Advance Directives Information Provided To Patient: No


Advance Directives on File: No


Living Will: No


Power of : No


Advance Care Planning: Not Discussed or Documented [[id number]]





- Elder Abuse Suspicion Index


Screening: Elder Abuse Suspicion Index Screening


Rely on people for bathing, dressing, shopping, banking, etc: No


Prevented from getting food, clothes, medication, etc: No


Made to feel shamed or threatened by someone: No


Forced to sign papers or use money against will: No


Feel afraid, touched in ways not wanted or hurt physically: No


Poor eye contact, withdrawn, malnourished, cuts or bruises: No


Screening Result: Negative result


EASI Reference Information: Sylvester LEVY, Saravanan C, Gaye D, Esteban MCINTOSH.Development and validation of a tool to assist physicians identification of 

elder abuse: The Elder Abuse Suspicion  Index (EASI ).  Journal of Elder Abuse 

and Neglect, 2008; 20 (3): 276-300.





- Elder Maltreatment Screen


Quality Measures: Elder Maltreatment Screen and Follow-Up Plan


Elder Maltreatment Screen: <Negative, No Follow-Up Plan Required> []

## 2018-09-19 NOTE — RADIOLOGY REPORT
EXAM:  CHEST, TWO VIEWS



HISTORY:  FEVER AND WEAKNESS FOR ONE DAY.   TOTAL KNEE ARTHROPLASTY ONE WEEK 
AGO.  FORMER SMOKER.  



TECHNIQUE:  Upright PA and lateral views of the chest were obtained.  



Comparison:  Two view chest radiographic examination dated 6/17/17.  



FINDINGS:  The heart is not enlarged.  No pulmonary venous hypertension is 
seen.  The thoracic aorta is mildly tortuous and atherosclerotic.  Minimal 
reticulonodular opacity prominence is again noted within the lateral lung bases 
consistent with mild chronic interstitial change.  The lungs and pleural spaces 
are otherwise clear.  There are degenerative changes scattered throughout the 
visualized spine and shoulder girdles.  



IMPRESSION:  

NO RADIOGRAPHIC EVIDENCE OF ACUTE CARDIOPULMONARY DISEASE.  SUBTLE 
RETICULONODULAR OPACITY PROMINENCE AGAIN SUGGESTED WITHIN THE LATERAL LUNG 
BASES LIKELY RELATING TO CHRONIC INTERSTITIAL CHANGE.  



JOB NUMBER:  [id number]
MTDD

## 2018-12-23 ENCOUNTER — HOSPITAL ENCOUNTER (OUTPATIENT)
Dept: HOSPITAL 59 - ER | Age: 73
Setting detail: OBSERVATION
LOS: 1 days | Discharge: HOME | End: 2018-12-24
Attending: INTERNAL MEDICINE | Admitting: INTERNAL MEDICINE
Payer: COMMERCIAL

## 2018-12-23 DIAGNOSIS — I48.91: ICD-10-CM

## 2018-12-23 DIAGNOSIS — I10: ICD-10-CM

## 2018-12-23 DIAGNOSIS — T36.8X5A: Primary | ICD-10-CM

## 2018-12-23 DIAGNOSIS — Z87.891: ICD-10-CM

## 2018-12-23 DIAGNOSIS — E03.9: ICD-10-CM

## 2018-12-23 DIAGNOSIS — Z78.9: ICD-10-CM

## 2018-12-23 DIAGNOSIS — Z79.01: ICD-10-CM

## 2018-12-23 PROCEDURE — 99285 EMERGENCY DEPT VISIT HI MDM: CPT

## 2018-12-23 PROCEDURE — 86140 C-REACTIVE PROTEIN: CPT

## 2018-12-23 PROCEDURE — 80048 BASIC METABOLIC PNL TOTAL CA: CPT

## 2018-12-23 PROCEDURE — 99236 HOSP IP/OBS SAME DATE HI 85: CPT

## 2018-12-23 PROCEDURE — 80051 ELECTROLYTE PANEL: CPT

## 2018-12-23 PROCEDURE — 81003 URINALYSIS AUTO W/O SCOPE: CPT

## 2018-12-23 PROCEDURE — 85027 COMPLETE CBC AUTOMATED: CPT

## 2018-12-23 PROCEDURE — 96374 THER/PROPH/DIAG INJ IV PUSH: CPT

## 2018-12-23 PROCEDURE — 80053 COMPREHEN METABOLIC PANEL: CPT

## 2018-12-23 NOTE — EMERGENCY DEPARTMENT RECORD
History of Present Illness





- General


Chief complaint: Rash


Stated complaint: RASH


Time Seen by Provider: 18 23:37


Source: Patient


Mode of Arrival: Ambulatory


Limitations: No limitations





- History of Present Illness


Initial comments: 


The patient is here due to noticing a painful area over her L groin where the 

skin is beginning to peel about 2 hours ago. She also has a very small spot on 

her L flank also. She has no pain there unless she is moving or touching it. 

There has been no reported SOB, fever, chills, nausea, vomiting, or diarrhea. 

The patient was diagnosed with a kidney infection 3 days ago by her PCP and 

placed on Bactrim and Cipro. 





MD complaint: Rash


Onset/Timin


-: Hour(s)


Severity: Mild


Severity scale (1-10): 4


Consistency: Getting worse


Context: Recent antibiotic


Associated symptoms: Denies other symptoms


Treatments Prior to Arrival: Bandages





- Related Data


 Home Medications











 Medication  Instructions  Recorded  Confirmed  Last Taken


 


Ciprofloxacin HCl [Cipro] 1 tab PO BID 18


 


Cyclobenzaprine HCl 10 mg PO Q8H PRN 18


 


Melatonin 10 mg PO QHS PRN 18 Unknown


 


Sulfamethoxazole/Trimethoprim 1 tab PO BID 18





[Sulfamethoxazole-Tmp Ds Tablet]    











 Allergies











Allergy/AdvReac Type Severity Reaction Status Date / Time


 


No Known Drug Allergies Allergy   Verified 14 14:53














Travel Screening





- Travel/Exposure Within Last 30 Days


Have you traveled within the last 30 days?: No





- Travel Symptoms


Symptom Screening: Rash





- Additional Travel Comment


Additional Travel/Exposure Comment: localized open area of skin, no itching.





Review of Systems


Constitutional: Denies: Chills, Fever


Eyes: Denies: Eye discharge


ENT: Denies: Congestion


Respiratory: Denies: Cough, Dyspnea





Past Medical History





- SOCIAL HISTORY


Smoking Status: Former smoker


Alcohol Use: Heavy


Drug Use: None





- RESPIRATORY


Hx Respiratory Disorders: Yes


Hx Asthma: Yes





- CARDIOVASCULAR


Hx Cardio Disorders: Yes


Hx Hypertension: Yes


Hx Irregular Heartbeat: Yes





- NEURO


Hx Neuro Disorders: No





- GI


Hx GI Disorders: No





- 


Hx Genitourinary Disorders: Yes


Hx UTI: Yes





- ENDOCRINE


Hx Endocrine Disorders: No


Hx Diabetes: No


Hx Thyroid Disease: No





- MUSCULOSKELETAL


Hx Musculoskeletal Disorders: Yes


Hx Arthritis: Yes


Comment:: low back, hips and knees. bilateral knee replacements





- PSYCH


Hx Psych Problems: No





- HEMATOLOGY/ONCOLOGY


Hx Hematology/Oncology Disorders: No





Family Medical History


Any Significant Family History?: Yes


Hx Alcohol Use: Grandparents


Hx Cancer: Grandparents


Hx Heart Disease: Father





Physical Exam





- General


General Appearance: Alert, Oriented x3, Cooperative, No acute distress (The 

patient is very cooperative and nontoxic.)





- Head


Head exam: Atraumatic, Normocephalic





- Eye


Eye exam: Normal appearance, PERRL, EOMI





- ENT


Throat exam: Normal inspection.  negative: Tonsillar erythema, Tonsillar exudate





- Neck


Neck exam: Normal inspection, Full ROM.  negative: Tenderness





- Respiratory


Respiratory exam: Normal lung sounds bilaterally.  negative: Respiratory 

distress





- Cardiovascular


Cardiovascular Exam: Regular rate, Normal rhythm, Normal heart sounds





- GI/Abdominal


GI/Abdominal exam: Soft, Normal bowel sounds.  negative: Tenderness





- Extremities


Extremities exam: negative: Normal inspection (There is a 4x4 cm area of skin 

sloughing over the proximal L anterior thigh just distal to the L groin area. 

There is no swelling or edema but the area is mildly tender surrounding the 

peeling area..), Joint swelling, Tenderness


Image of Full Body: 


  __________________________














  __________________________





 1 - Area of skin peeling.





 2 - ARea of skin just starting to peel.








- Back


Back exam: Denies: Normal inspection (There is a 2x2 cm area of skin beging to 

blister with no surrounding erythema or tenderness.)





- Neurological


Neurological exam: Alert





Course





 Vital Signs











  18





  23:34


 


Temperature 97.5 F L


 


Pulse Rate [ 84





Pulse Ox Probe] 


 


Respiratory 24





Rate 


 


Blood Pressure 182/98





[Left Arm] 


 


Pulse Ox 96














- Reevaluation(s)


Reevaluation #1: 


The patient is doing OK at this time. She is beginning to develop some erythema 

to the R eyelid. The L thigh wound is not worse at this time and is not painful 

unless we touch the area. I do believe the issues are related to an adverse 

drug reaction to most likely Cipro and due to that fact along with the low 

sodium I am recommending that the patient stay overnight for monitoring and 

repeat testing. The patient does agree to the plan.


18 01:27








Medical Decision Making





- Data Complexity


MDM Data: Labs Ordered and/or Reviewed





- Lab Data


Result diagrams: 


 18 23:58





 18 00:51





Disposition


Disposition: Admit


Clinical Impression: 


 Adverse drug event





Disposition: Still a Patient at [facility]


Decision to Admit: Admit from ER


Decision to Admit Date: 18


Decision to Admit Time: :


Accepting Physician: Cl Morgan Discussed w/Accepting Physician: 


Condition: (2) Stable


Time of Disposition: :29





Quality





- Quality Measures


Quality Measures: N/A





- Blood Pressure Screening


View Details: Yes


Does Patient Have Any of the Following: Active Dx of HTN


Blood Pressure Classification: Hypertensive Reading


Systolic Measurement: 182


Diastolic Measurement: 98


Screening for High Blood Pressure: Patient Exclusion, Hx of HTN []

## 2018-12-24 LAB
ALBUMIN SERPL-MCNC: 3.5 G/DL (ref 4–5)
ALBUMIN/GLOB SERPL: 0.8 {RATIO} (ref 1.1–1.8)
ALP SERPL-CCNC: 105 U/L (ref 35–104)
ALT SERPL-CCNC: 27 U/L (ref ?–33)
ANION GAP SERPL CALC-SCNC: 15 MMOL/L (ref 7–16)
ANION GAP SERPL CALC-SCNC: 17 MMOL/L (ref 7–16)
ANION GAP SERPL CALC-SCNC: 17 MMOL/L (ref 7–16)
APPEARANCE UR: CLEAR
AST SERPL-CCNC: 39 U/L (ref 10–35)
BILIRUB SERPL-MCNC: 0.2 MG/DL (ref 0.2–1)
BILIRUB UR-MCNC: NEGATIVE MG/DL
BUN SERPL-MCNC: 23 MG/DL (ref 8–23)
BUN SERPL-MCNC: 27 MG/DL (ref 8–23)
CO2 SERPL-SCNC: 21 MMOL/L (ref 22–29)
CO2 SERPL-SCNC: 23 MMOL/L (ref 22–29)
CO2 SERPL-SCNC: 24 MMOL/L (ref 22–29)
COLOR UR: YELLOW
CREAT SERPL-MCNC: 1.1 MG/DL (ref 0.5–0.9)
CREAT SERPL-MCNC: 1.2 MG/DL (ref 0.5–0.9)
EOSINOPHIL NFR BLD: 1 % (ref 0–6)
ERYTHROCYTE [DISTWIDTH] IN BLOOD BY AUTOMATED COUNT: 12.5 % (ref 11.5–14.5)
EST GLOMERULAR FILTRATION RATE: 47 ML/MIN
EST GLOMERULAR FILTRATION RATE: 51 ML/MIN
GLOBULIN SER-MCNC: 4.2 GM/DL (ref 1.4–4.8)
GLUCOSE SERPL-MCNC: 126 MG/DL (ref 74–109)
GLUCOSE SERPL-MCNC: 158 MG/DL (ref 74–109)
GLUCOSE UR STRIP-MCNC: NEGATIVE MG/DL
HCT VFR BLD CALC: 38.1 % (ref 35–47)
HGB BLD-MCNC: 13.6 GM/DL (ref 11.6–16)
KETONES UR QL STRIP: NEGATIVE
LYMPHOCYTES NFR BLD: 29 % (ref 16–45)
MCH RBC QN AUTO: 32.6 PG (ref 27–33)
MCHC RBC AUTO-ENTMCNC: 35.7 G/DL (ref 32–36)
MCV RBC AUTO: 91.4 FL (ref 81–97)
MONOCYTES NFR BLD: 9 % (ref 0–9)
NEUTROPHILS NFR BLD AUTO: 58 % (ref 47–80)
NEUTS BAND NFR BLD: 3 % (ref 0–5)
NITRITE UR QL STRIP: NEGATIVE
PLATELET # BLD EST: NORMAL 10*3/UL
PLATELET # BLD: 318 K/UL (ref 130–400)
PMV BLD AUTO: 9.8 FL (ref 7.4–10.4)
PROT SERPL-MCNC: 7.7 G/DL (ref 6.6–8.7)
PROT UR QL STRIP: NEGATIVE
RBC # BLD AUTO: 4.17 M/UL (ref 3.8–5.4)
RBC # UR STRIP: (no result) /UL
RBC MORPH BLD: NORMAL
URINE LEUKOCYTE ESTERASE: NEGATIVE
UROBILINOGEN UR STRIP-ACNC: 0.2 E.U./DL (ref 0.2–1)
WBC # BLD AUTO: 8 K/UL (ref 4.2–12.2)

## 2018-12-24 NOTE — DISCHARGE SUMMARY
Providers


Discharge Summary Date: 12/24/18


Date of admission: 


12/24/18 01:43





Attending physician: 


ALETA AGUDELO





Primary care physician: 


PATTI BARRON M.D.








Physical Exam





- Vital Signs


Vital Signs: 


 Vital Signs - Last 24 Hrs











  Temp Pulse Resp BP Pulse Ox


 


 12/24/18 11:41  97.7 F  75  18  146/57  94 L


 


 12/24/18 09:00  97.7 F   18  147/55  95


 


 12/24/18 08:08    20  


 


 12/23/18 23:34  97.5 F L  84  24  182/98  96














- General


General Appearance: Alert, Oriented x3, Cooperative, No acute distress (The 

patient is very cooperative and nontoxic.)


Limitations: No limitations





- Head


Head exam: Atraumatic, Normocephalic





- Eye


Eye exam: Normal appearance, PERRL, EOMI





- ENT


Throat exam: Normal inspection.  negative: Tonsillar erythema, Tonsillar exudate





- Neck


Neck exam: Normal inspection, Full ROM.  negative: Tenderness





- Respiratory


Respiratory exam: Normal lung sounds bilaterally.  negative: Respiratory 

distress





- Cardiovascular


Cardiovascular Exam: Regular rate, Normal rhythm, Normal heart sounds


Peripheral Pulses: 3+: Radial (R), Radial (L), Dorsalis Pedis (R), Dorsalis 

Pedis (L)





- GI/Abdominal


GI/Abdominal exam: Soft, Normal bowel sounds.  negative: Tenderness





- Extremities


Extremities exam: negative: Normal inspection (There is a 4x4 cm area of skin 

sloughing over the proximal L anterior thigh just distal to the L groin area. 

There is no swelling or edema but the area is mildly tender surrounding the 

peeling area..), Joint swelling, Tenderness





- Back


Back exam: Denies: Normal inspection (There is a 2x2 cm area of skin beging to 

blister with no surrounding erythema or tenderness.)





- Neurological


Neurological exam: Alert





- Skin


Skin exam: Erythema, Other (sloughed skin right upper/inner thigh and right 

lower back )





Hospitalization





- Hospitalization


Admission Diagnosis: 1. Adverse Drug Reaction with Hyponatremia





- Problem List/Discharge Diagnosis


(1) Adverse drug event


Current Visit: Yes   Status: Acute   Base Code: T88.7XXA - UNSP ADVERSE EFFECT 

OF DRUG OR MEDICAMENT, INIT ENCNTR   Comment: 


12/24/18:


- Reaction to Cipro and Bactrim causing isolated skin reaction of right groin, 

flank and eyelid swelling. 


- No previous drug reactions. 


- Given IV solumedrol 125mg and Diphenyhydramine 50mg PO.    





(2) HTN (hypertension)


Current Visit: Yes   Status: Acute   Base Code: I10 - ESSENTIAL (PRIMARY) 

HYPERTENSION   Comment: 


12/24/18:


- elevated blood pressure on admission. 


- Continue Losartan 100mg daily.    





(3) A-fib


Current Visit: Yes   Status: Acute   Base Code: I48.91 - UNSPECIFIED ATRIAL 

FIBRILLATION   Comment: 


12/24/18: stable 


- on Propafenone 150mg TID


- not anticoagulated    





(4) DVT prophylaxis


Current Visit: No   Status: Acute   Base Code: [id number] -    Comment: 


12/24/18: 


- Lovenox 40mg QHS    





(5) Full code status


Current Visit: No   Status: Acute   Base Code: Z78.9 - OTHER SPECIFIED HEALTH 

STATUS   Comment: 


12/24/18:


- Full code    





- Hospitalization Course


Hospital Course: 


Mrs. Calderon is a 72 y/o female who came in to the ED with complaint of skin 

sloughing and rash. She says that her symptoms began around 8 pm last night 

while at home. She says she first noticed it on her right inner thigh and then 

felt a rash with burning on her right lower back. She says that she went to her 

doctor last week for a UTI and was prescribed Bactrim and Ciprofloxacin. She 

says that she began taking it Friday night and her last dose was yesterday just 

before coming to the hospital. 





She denies any previous reaction to antibiotics or other medications. On 

admission the patient was started on IV solumedrol and IV fluids. Her initial 

labs showed mild acute kidney injury and her hyponatremia with sodium of 122. 


Abnormal Labs: 


 Abnormal Lab Results











  12/23/18 12/23/18 12/24/18 Range/Units





  23:58 23:58 00:51 


 


Sodium  122 L   126 L  (136-145)  mmol/L


 


Potassium  4.6 H   3.0 L  (3.4-4.5)  mmol/L


 


Chloride  84 L   85 L  ()  mmol/L


 


Carbon Dioxide  21.0 L    (22-29)  mmol/L


 


Anion Gap  17.0 H   17.0 H  (7-16)  


 


BUN  27 H    (8-23)  mg/dL


 


Creatinine  1.2 H    (0.5-0.9)  mg/dL


 


Random Glucose  126 H    ()  mg/dL


 


Calcium  8.7 L    (8.8-10.2)  mg/dL


 


AST  39 H    (10.0-35.0)  U/L


 


Alkaline Phosphatase  105 H    ()  U/L


 


C-Reactive Protein   12.80 H   (<0.5)  mg/dL


 


Albumin  3.5 L    (4.0-5.0)  g/dL


 


Albumin/Globulin Ratio  0.8 L    (1.1-1.8)  














  12/24/18 Range/Units





  06:27 


 


Sodium  128 L  (136-145)  mmol/L


 


Potassium   (3.4-4.5)  mmol/L


 


Chloride  90 L  ()  mmol/L


 


Carbon Dioxide   (22-29)  mmol/L


 


Anion Gap   (7-16)  


 


BUN   (8-23)  mg/dL


 


Creatinine  1.1 H  (0.5-0.9)  mg/dL


 


Random Glucose  158 H  ()  mg/dL


 


Calcium  8.6 L  (8.8-10.2)  mg/dL


 


AST   (10.0-35.0)  U/L


 


Alkaline Phosphatase   ()  U/L


 


C-Reactive Protein   (<0.5)  mg/dL


 


Albumin   (4.0-5.0)  g/dL


 


Albumin/Globulin Ratio   (1.1-1.8)  











Condition at Discharge: (2) Stable





Discharge Medications





- Discharge Medications


Home Medications: 


 Ambulatory Orders





Omeprazole 40 mg PO DAILY 05/23/14 [Last Taken 12/23/18]


Fluoxetine HCl 40 mg PO DAILY 06/17/17 [Last Taken 12/23/18]


Losartan Potassium 100 mg PO DAILY 06/17/17 [Last Taken 12/23/18]


Hydrochlorothiazide [Hctz] 25 mg PO DAILY 09/18/18 [Last Taken 12/23/18]


Meloxicam 15 mg PO DAILY 09/18/18 [Last Taken 12/23/18]


Propafenone HCl [Propafenone HCl ER] 150 mg PO TID 09/18/18 [Last Taken 12/23/18

]


Cyclobenzaprine HCl 10 mg PO Q8H PRN 12/23/18 [Last Taken 12/23/18]


Melatonin 10 mg PO QHS PRN 12/23/18 [Last Taken Unknown]


Bacitracin 14 gm TP BID #1 oint...g. 12/24/18 [Last Taken Unknown]











Discharge Plan





- Discharge Instructions


Instructions:  Acute Rash (ED)


Additional Instructions: 


Apply Bacitracin ointment to areas twice daily for the next week. 





Keep the wounds dressed and clean with the dressing provided. 





Follow up with your primary doctor in 1 week. Let your doctor know about the 

reaction to the medication so it can be listed as an allergy for you. 





If the wounds begin draining or become more red and painful please return to 

the ED. 





Quality Measures





- Quality Measures


Quality Measures: Atrial Fibrillation & Atrial Flutter: Chronic Anticoagulation 

Therapy, Advance Directives, Documentation of Current Medications in Medical 

Record, Elder Maltreatment Screen and Follow-Up Plan, Screening for High Blood 

Pressure and F/U Documented





- Current Medications


Quality Measure: Measure #130: Documentation of Current Medications


Documentation of Current Medications: <Current Medications Documented/Reviewed> 

[]





- Blood Pressure Screening


Quality Measure: Screening for High Blood Pressure and Follow-Up Documented


Does Patient Have Any of the Following: Active Dx of HTN


Blood Pressure Classification: Hypertensive Reading


Systolic Measurement: 146


Diastolic Measurement: 57


Screening for High Blood Pressure: Patient Exclusion, Hx of HTN []





- Atrial Fibrillation and Atrial Flutter


Quality Measure: Atrial Fibrillation & Atrial Flutter: Chronic Anticoagulation 

Therapy


Does Patient Have Any of the Following: No


CHADS2 Risk Stratification: Age 75 or Greater, Hypertension


Risk Stratification Summary: One or more high risk factors OR more than one 

moderate risk factor exists. []


Anticoagulation Therapy: <Oral anticoagulant Prescribed> []





- Advance Directives


Quality Measure: Measure #47: Care Plan


Advance Directives Established: No


Advance Directives Information Provided To Patient: No


Advance Directives on File: No


Living Will: No


Power of : No


Advance Care Planning: <Care Plan/Decision Maker Not Decided; Discussed & 

Documented> [2696O]





- Elder Abuse Suspicion Index


Screening: Elder Abuse Suspicion Index Screening


Rely on people for bathing, dressing, shopping, banking, etc: No


Prevented from getting food, clothes, medication, etc: No


Made to feel shamed or threatened by someone: No


Forced to sign papers or use money against will: No


Feel afraid, touched in ways not wanted or hurt physically: No


Poor eye contact, withdrawn, malnourished, cuts or bruises: No


Screening Result: Negative result


EASI Reference Information: Yaffe MJ, Saravanan C, Gaye D, Esteban MCINTOSH.Development and validation of a tool to assist physicians identification of 

elder abuse: The Elder Abuse Suspicion  Index (EASI ).  Journal of Elder Abuse 

and Neglect, 2008; 20 (3): 276-300.





- Elder Maltreatment Screen


Quality Measures: Elder Maltreatment Screen and Follow-Up Plan


Elder Maltreatment Screen: <Negative, No Follow-Up Plan Required> []

## 2018-12-24 NOTE — HISTORY & PHYSICAL
History of Present Illness





- Date of Service


Date of Service for History & Physical: 12/24/18





- History of Present Illness


Admitting Diagnosis: 1. Adverse Drug Reaction with Hyponatremia


History of Present Illness: 


Mrs. Calderon is a 74 y/o female who came in to the ED with complaint of skin 

sloughing and rash. She says that her symptoms began around 8 pm last night 

while at home. She says she first noticed it on her right inner thigh and then 

felt a rash with burning on her right lower back. She says that she went to her 

doctor last week for a UTI and was prescribed Bactrim and Ciprofloxacin. She 

says that she began taking it Friday night and her last dose was yesterday just 

before coming to the hospital. 





She denies any previous reaction to antibiotics or other medications. On 

admission the patient was started on IV solumedrol and IV fluids. Her initial 

labs showed mild acute kidney injury and her hyponatremia with sodium of 122. 





Travel Screening





- Travel/Exposure Within Last 30 Days


Have you traveled within the last 30 days?: No





- Travel/Exposure Within Last Year


Have you traveled outside the [place] in the last year?: No





- Additonal Travel Details


Have you been exposed to anyone with a communicable illness?: No





- Travel Symptoms


Symptom Screening: Rash





- Additional Travel Comment


Additional Travel/Exposure Comment: localized open area of skin, no itching.





Review of Systems


Constitutional: Denies: Chills, Fever


Eyes: Denies: Eye discharge


ENT: Denies: Congestion


Respiratory: Denies: Cough, Dyspnea





Past Medical History





- SOCIAL HISTORY


Smoking Status: Former smoker


Alcohol Use: Occasional


Alcohol Use Comment: moderately


Drug Use: None





- RESPIRATORY


Hx Respiratory Disorders: Yes


Hx Asthma: Yes





- CARDIOVASCULAR


Hx Cardio Disorders: Yes


Hx Hypertension: Yes


Hx Irregular Heartbeat: Yes





- NEURO


Hx Neuro Disorders: No





- GI


Hx GI Disorders: No





- 


Hx Genitourinary Disorders: Yes


Hx UTI: Yes





- ENDOCRINE


Hx Endocrine Disorders: No


Hx Diabetes: No


Hx Thyroid Disease: No





- MUSCULOSKELETAL


Hx Musculoskeletal Disorders: Yes


Hx Arthritis: Yes


Comment:: low back, hips and knees. bilateral knee replacements





- PSYCH


Hx Psych Problems: No





- HEMATOLOGY/ONCOLOGY


Hx Hematology/Oncology Disorders: No





Family Medical History


Any Significant Family History?: Yes


Hx Alcohol Use: Grandparents


Hx Cancer: Grandparents


Hx Heart Disease: Father





H&P Meds/Allergies





- Allergies


Allergies: 


 Allergies











Allergy/AdvReac Type Severity Reaction Status Date / Time


 


No Known Drug Allergies Allergy   Verified 05/23/14 14:53














- Home Medications


 Home Medications











 Medication  Instructions  Recorded  Confirmed  Last Taken


 


Ciprofloxacin HCl [Cipro] 1 tab PO BID 12/23/18 12/23/18 12/23/18


 


Cyclobenzaprine HCl 10 mg PO Q8H PRN 12/23/18 12/23/18 12/23/18


 


Melatonin 10 mg PO QHS PRN 12/23/18 12/23/18 Unknown


 


Sulfamethoxazole/Trimethoprim 1 tab PO BID 12/23/18 12/23/18 12/23/18





[Sulfamethoxazole-Tmp Ds Tablet]    














- Active Medications


Active Medications: 


 Current Medications





Acetaminophen (Tylenol 500mg Tab)  500 mg PO Q6H PRN


   PRN Reason: PAIN - MILD(1-4)/FEVER


Cyclobenzaprine HCl (Flexeril)  10 mg PO Q8H PRN


   PRN Reason: SPASMS


Diphenhydramine HCl (Benadryl Capsule)  50 mg PO Q6H PRN


   PRN Reason: itching


   Last Admin: 12/24/18 02:16 Dose:  50 mg


Fluoxetine HCl (Prozac)  40 mg PO DAILY [facility]


Sodium Chloride ()  1,000 mls @ 100 mls/hr IV .Q10H PRN


   PRN Reason: LARGE VOLUME IV


   Last Admin: 12/24/18 07:58 Dose:  100 mls/hr


Losartan Potassium (Losartan Potassium)  100 mg PO DAILY [facility]


Melatonin (Melatonin)  10 mg PO QHS PRN


   PRN Reason: SLEEP


Methylprednisolone Sodium Succinate (Solu-Medrol)  60 mg IVP DAILY [facility]


Non-Formulary Medication (Propafenone Hcl [Propafenone Hcl Er])  150 mg PO 

DAILY [facility]


Pantoprazole Sodium (Protonix)  80 mg PO DAILYAC [facility]


   Last Admin: 12/24/18 06:13 Dose:  80 mg











Physical Exam





- Vital Signs


Vital Signs: 


 Vital Signs - Last 24 Hrs











  Temp Pulse Resp BP Pulse Ox


 


 12/24/18 08:08    20  


 


 12/23/18 23:34  97.5 F L  84  24  182/98  96














- General


General Appearance: Alert, Oriented x3, Cooperative, No acute distress (The 

patient is very cooperative and nontoxic.)


Limitations: No limitations





- Head


Head exam: Atraumatic, Normocephalic





- Eye


Eye exam: Normal appearance, PERRL, EOMI





- ENT


Throat exam: Normal inspection.  negative: Tonsillar erythema, Tonsillar exudate





- Neck


Neck exam: Normal inspection, Full ROM.  negative: Tenderness





- Respiratory


Respiratory exam: Normal lung sounds bilaterally.  negative: Respiratory 

distress





- Cardiovascular


Cardiovascular Exam: Regular rate, Normal rhythm, Normal heart sounds


Peripheral Pulses: 3+: Radial (R), Radial (L), Dorsalis Pedis (R), Dorsalis 

Pedis (L)





- GI/Abdominal


GI/Abdominal exam: Soft, Normal bowel sounds.  negative: Tenderness





- Extremities


Extremities exam: negative: Normal inspection (There is a 4x4 cm area of skin 

sloughing over the proximal L anterior thigh just distal to the L groin area. 

There is no swelling or edema but the area is mildly tender surrounding the 

peeling area..), Joint swelling, Tenderness





- Back


Back exam: Denies: Normal inspection (There is a 2x2 cm area of skin beging to 

blister with no surrounding erythema or tenderness.)





- Neurological


Neurological exam: Alert





- Skin


Skin exam: Erythema, Other (sloughed skin right upper/inner thigh and right 

lower back )





Results





- Labs


Result Diagrams: 


 12/23/18 23:58





 12/24/18 06:27


Labs Last 24 Hours: 


 Laboratory Results - last 24 hr











  12/23/18 12/23/18 12/23/18





  23:58 23:58 23:58


 


WBC  8.0  


 


RBC  4.17  


 


Hgb  13.6  


 


Hct  38.1  


 


MCV  91.4  


 


MCH  32.6  


 


MCHC  35.7  


 


RDW  12.5  


 


Plt Count  318  


 


MPV  9.8  


 


Neutrophils %  58.0  


 


Band Neutrophils %  3.0  


 


Eosinophils %  Not Reportable  


 


Basophils %  Not Reportable  


 


Lymphocytes  29.0  


 


Monocytes  9.0  


 


Platelet Estimate  Normal  


 


RBC Morphology  Normal  


 


Morphology Comment  Np  


 


Eosinophil Count  1.0  


 


Sodium   122 L 


 


Potassium   4.6 H 


 


Chloride   84 L 


 


Carbon Dioxide   21.0 L 


 


Anion Gap   17.0 H 


 


BUN   27 H 


 


Creatinine   1.2 H 


 


Estimated GFR   47 


 


Random Glucose   126 H 


 


Calcium   8.7 L 


 


Total Bilirubin   0.20 


 


AST   39 H 


 


ALT   27 


 


Alkaline Phosphatase   105 H 


 


C-Reactive Protein    12.80 H


 


Total Protein   7.7 


 


Albumin   3.5 L 


 


Globulin   4.2 


 


Albumin/Globulin Ratio   0.8 L 


 


Urine Color   


 


Urine Appearance   


 


Urine pH   


 


Ur Specific Gravity   


 


Urine Protein   


 


Urine Glucose (UA)   


 


Urine Ketones   


 


Urine Blood   


 


Urine Nitrite   


 


Urine Bilirubin   


 


Urine Urobilinogen   


 


Ur Leukocyte Esterase   














  12/23/18 12/24/18 12/24/18





  Unknown 00:51 06:27


 


WBC   


 


RBC   


 


Hgb   


 


Hct   


 


MCV   


 


MCH   


 


MCHC   


 


RDW   


 


Plt Count   


 


MPV   


 


Neutrophils %   


 


Band Neutrophils %   


 


Eosinophils %   


 


Basophils %   


 


Lymphocytes   


 


Monocytes   


 


Platelet Estimate   


 


RBC Morphology   


 


Morphology Comment   


 


Eosinophil Count   


 


Sodium   126 L  128 L


 


Potassium   3.0 L  4.0


 


Chloride   85 L  90 L


 


Carbon Dioxide   24.0  23.0


 


Anion Gap   17.0 H  15.0


 


BUN    23


 


Creatinine    1.1 H


 


Estimated GFR    51


 


Random Glucose    158 H


 


Calcium    8.6 L


 


Total Bilirubin   


 


AST   


 


ALT   


 


Alkaline Phosphatase   


 


C-Reactive Protein   


 


Total Protein   


 


Albumin   


 


Globulin   


 


Albumin/Globulin Ratio   


 


Urine Color  Yellow  


 


Urine Appearance  Clear  


 


Urine pH  6.5  


 


Ur Specific Gravity  1.015  


 


Urine Protein  Negative  


 


Urine Glucose (UA)  Negative  


 


Urine Ketones  Negative  


 


Urine Blood  Trace-i  


 


Urine Nitrite  Negative  


 


Urine Bilirubin  Negative  


 


Urine Urobilinogen  0.2  


 


Ur Leukocyte Esterase  Negative  














VTE H&P Assessment





- Risk for VTE


Risk for VTE: Yes


Risk Level: High


Risk Assessment Date: 12/24/18


Risk Assessment Time: 11:00


VTE Orders Placed or Will Be Placed: Yes





Plan





- Detailed Diagnosis and Plan


(1) Adverse drug event


Current Visit: Yes   Status: Acute   Base Code: T88.7XXA - UNSP ADVERSE EFFECT 

OF DRUG OR MEDICAMENT, INIT ENCNTR   Comment: 


12/24/18:


- Reaction to Cipro and Bactrim causing isolated skin reaction of right groin, 

flank and eyelid swelling. 


- No previous drug reactions. 


- Given IV solumedrol 125mg and Diphenyhydramine 50mg PO.    





(2) HTN (hypertension)


Current Visit: Yes   Status: Acute   Base Code: I10 - ESSENTIAL (PRIMARY) 

HYPERTENSION   Comment: 


12/24/18:


- elevated blood pressure on admission. 


- Continue Losartan 100mg daily.    





(3) A-fib


Current Visit: Yes   Status: Acute   Base Code: I48.91 - UNSPECIFIED ATRIAL 

FIBRILLATION   Comment: 


12/24/18: stable 


- on Propafenone 150mg TID


- not anticoagulated    





(4) DVT prophylaxis


Current Visit: No   Status: Acute   Base Code: IFI4269 -    Comment: 


12/24/18: 


- Lovenox 40mg QHS    





(5) Full code status


Current Visit: No   Status: Acute   Base Code: Z78.9 - OTHER SPECIFIED HEALTH 

STATUS   Comment: 


12/24/18:


- Full code